# Patient Record
Sex: MALE | Race: WHITE | NOT HISPANIC OR LATINO | URBAN - METROPOLITAN AREA
[De-identification: names, ages, dates, MRNs, and addresses within clinical notes are randomized per-mention and may not be internally consistent; named-entity substitution may affect disease eponyms.]

---

## 2020-01-30 ENCOUNTER — IMPORTED ENCOUNTER (OUTPATIENT)
Dept: URBAN - METROPOLITAN AREA CLINIC 1 | Facility: CLINIC | Age: 85
End: 2020-01-30

## 2020-01-30 PROBLEM — H43.812: Noted: 2020-01-30

## 2020-01-30 PROBLEM — H18.421: Noted: 2020-01-30

## 2020-01-30 PROBLEM — H17.822: Noted: 2020-01-30

## 2020-01-30 PROBLEM — H35.3111: Noted: 2020-01-30

## 2020-01-30 PROBLEM — H04.123: Noted: 2020-01-30

## 2020-01-30 PROBLEM — Z96.1: Noted: 2020-01-30

## 2020-01-30 PROCEDURE — 92004 COMPRE OPH EXAM NEW PT 1/>: CPT

## 2020-01-30 PROCEDURE — 92015 DETERMINE REFRACTIVE STATE: CPT

## 2020-01-30 NOTE — PATIENT DISCUSSION
1.  ARMD OD Early/dry/stable. Importance of daily AREDS II study multivitamin and Amsler Grid checks discussed with patient. Patient to follow-up immediately with any new onset of decreased vision and/or metamorphopsia. 2. Dry Eyes OU - Recommend ATs BID OU routinely 3. 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 Pseudophakia OU - (Done Elsewhere) 5. Band K OD - Observe 6. 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 MRX for glasses given. Return for an appointment in 6 months 10/DFE with Dr. Gabe Merritt.

## 2020-07-30 ENCOUNTER — IMPORTED ENCOUNTER (OUTPATIENT)
Dept: URBAN - METROPOLITAN AREA CLINIC 1 | Facility: CLINIC | Age: 85
End: 2020-07-30

## 2020-07-30 PROBLEM — H35.3111: Noted: 2020-07-30

## 2020-07-30 PROBLEM — H16.143: Noted: 2020-07-30

## 2020-07-30 PROBLEM — H04.123: Noted: 2020-07-30

## 2020-07-30 PROCEDURE — 92012 INTRM OPH EXAM EST PATIENT: CPT

## 2020-07-30 NOTE — PATIENT DISCUSSION
1.  ARMD OD Early/dry/stable. Importance of daily AREDS II study multivitamin and Amsler Grid checks discussed with patient. Patient to follow-up immediately with any new onset of decreased vision and/or metamorphopsia. 2. KELECHI w/ PEK OU - Recommend ATs BID OU routinely 3. 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 Pseudophakia OU - (Done Elsewhere) 5. Band K OD - Observe 6. 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 Return for an appointment in 1 year 27 with Dr. Jeanette Wallis.

## 2022-04-02 ASSESSMENT — VISUAL ACUITY
OD_CC: 20/30
OS_GLARE: 20/200
OS_SC: 20/30
OD_GLARE: 20/200
OD_GLARE: 20/200
OS_CC: J2
OS_GLARE: 20/200
OS_SC: 20/40
OD_SC: 20/40
OS_CC: 20/40+1
OD_SC: 20/25
OD_CC: J2

## 2022-04-02 ASSESSMENT — TONOMETRY
OD_IOP_MMHG: 13
OS_IOP_MMHG: 15
OS_IOP_MMHG: 14
OD_IOP_MMHG: 13